# Patient Record
(demographics unavailable — no encounter records)

---

## 2025-04-30 NOTE — HISTORY OF PRESENT ILLNESS
[Never] : never [Awakes Unrefreshed] : awakes unrefreshed [Daytime Somnolence] : daytime somnolence [Fatigue] : fatigue [Snoring] : snoring [TextBox_4] : 42-year-old female presents for preop pulmonary evaluation prior to bariatric surgery.  Patient has history of "asthma" hospitalized many years ago without intubation.  Presently she is "okay" on twice daily ICS use with albuterol as needed.  She denies SOB, cough, chest pain, hemoptysis, night sweats or weight loss.  Patient snores but is unaware of apneic episodes.  She complains of daytime somnolence and fatigue.  She denies excessive alcohol intake nor sedative hypnotic med use.

## 2025-04-30 NOTE — PROCEDURE
[FreeTextEntry1] : PFT results:Normal spirometry with significant bronchodilator response.  Mild restriction with decreased diffusion.

## 2025-04-30 NOTE — DISCUSSION/SUMMARY
[FreeTextEntry1] : 42-year-old female with stable preop pulmonary evaluation prior to bariatric surgery.  I reviewed the PFT results with the patient.  She is to continue use of her ICS twice daily with albuterol as needed.  Treatment adjustment will depend on symptomatic needs.  Diet weight loss and exercise were stressed.  A home sleep test will be performed.  She is to avoid sedative hypnotic meds and excessive alcohol intake.  At present there is no pulmonary contraindication for planned anesthesia and surgery.